# Patient Record
Sex: FEMALE | Race: BLACK OR AFRICAN AMERICAN | Employment: UNEMPLOYED | URBAN - METROPOLITAN AREA
[De-identification: names, ages, dates, MRNs, and addresses within clinical notes are randomized per-mention and may not be internally consistent; named-entity substitution may affect disease eponyms.]

---

## 2019-10-09 ENCOUNTER — HOSPITAL ENCOUNTER (INPATIENT)
Age: 34
LOS: 5 days | Discharge: HOME | DRG: 885 | End: 2019-10-14
Attending: EMERGENCY MEDICINE | Admitting: PSYCHIATRY & NEUROLOGY
Payer: COMMERCIAL

## 2019-10-09 DIAGNOSIS — Z00.8 MEDICAL CLEARANCE FOR PSYCHIATRIC ADMISSION: Primary | ICD-10-CM

## 2019-10-09 DIAGNOSIS — F30.9 MANIA (HCC): ICD-10-CM

## 2019-10-09 DIAGNOSIS — D64.9 CHRONIC ANEMIA: ICD-10-CM

## 2019-10-09 PROBLEM — F31.9 BIPOLAR 1 DISORDER (HCC): Status: ACTIVE | Noted: 2019-10-09

## 2019-10-09 LAB
ALBUMIN SERPL-MCNC: 4 G/DL (ref 3.5–5)
ALBUMIN/GLOB SERPL: 0.9 {RATIO} (ref 1.1–2.2)
ALP SERPL-CCNC: 65 U/L (ref 45–117)
ALT SERPL-CCNC: 26 U/L (ref 12–78)
AMPHET UR QL SCN: NEGATIVE
ANION GAP SERPL CALC-SCNC: 9 MMOL/L (ref 5–15)
APAP SERPL-MCNC: <2 UG/ML (ref 10–30)
APPEARANCE UR: CLEAR
AST SERPL-CCNC: 23 U/L (ref 15–37)
BACTERIA URNS QL MICRO: NEGATIVE /HPF
BARBITURATES UR QL SCN: NEGATIVE
BASOPHILS # BLD: 0.1 K/UL (ref 0–0.1)
BASOPHILS NFR BLD: 2 % (ref 0–1)
BENZODIAZ UR QL: NEGATIVE
BILIRUB SERPL-MCNC: 0.3 MG/DL (ref 0.2–1)
BILIRUB UR QL: NEGATIVE
BUN SERPL-MCNC: 11 MG/DL (ref 6–20)
BUN/CREAT SERPL: 10 (ref 12–20)
CALCIUM SERPL-MCNC: 9.4 MG/DL (ref 8.5–10.1)
CANNABINOIDS UR QL SCN: NEGATIVE
CHLORIDE SERPL-SCNC: 107 MMOL/L (ref 97–108)
CO2 SERPL-SCNC: 26 MMOL/L (ref 21–32)
COCAINE UR QL SCN: NEGATIVE
COLOR UR: NORMAL
CREAT SERPL-MCNC: 1.13 MG/DL (ref 0.55–1.02)
DIFFERENTIAL METHOD BLD: ABNORMAL
DRUG SCRN COMMENT,DRGCM: NORMAL
EOSINOPHIL # BLD: 0.2 K/UL (ref 0–0.4)
EOSINOPHIL NFR BLD: 4 % (ref 0–7)
EPITH CASTS URNS QL MICRO: NORMAL /LPF
ERYTHROCYTE [DISTWIDTH] IN BLOOD BY AUTOMATED COUNT: 17.6 % (ref 11.5–14.5)
ETHANOL SERPL-MCNC: <10 MG/DL
GLOBULIN SER CALC-MCNC: 4.4 G/DL (ref 2–4)
GLUCOSE SERPL-MCNC: 102 MG/DL (ref 65–100)
GLUCOSE UR STRIP.AUTO-MCNC: NEGATIVE MG/DL
HCG UR QL: NEGATIVE
HCT VFR BLD AUTO: 31.9 % (ref 35–47)
HGB BLD-MCNC: 9.4 G/DL (ref 11.5–16)
HGB UR QL STRIP: NEGATIVE
IMM GRANULOCYTES # BLD AUTO: 0 K/UL (ref 0–0.04)
IMM GRANULOCYTES NFR BLD AUTO: 0 % (ref 0–0.5)
KETONES UR QL STRIP.AUTO: NEGATIVE MG/DL
LEUKOCYTE ESTERASE UR QL STRIP.AUTO: NEGATIVE
LYMPHOCYTES # BLD: 1 K/UL (ref 0.8–3.5)
LYMPHOCYTES NFR BLD: 24 % (ref 12–49)
MCH RBC QN AUTO: 22.6 PG (ref 26–34)
MCHC RBC AUTO-ENTMCNC: 29.5 G/DL (ref 30–36.5)
MCV RBC AUTO: 76.7 FL (ref 80–99)
METHADONE UR QL: NEGATIVE
MONOCYTES # BLD: 0.5 K/UL (ref 0–1)
MONOCYTES NFR BLD: 13 % (ref 5–13)
NEUTS SEG # BLD: 2.3 K/UL (ref 1.8–8)
NEUTS SEG NFR BLD: 57 % (ref 32–75)
NITRITE UR QL STRIP.AUTO: NEGATIVE
NRBC # BLD: 0 K/UL (ref 0–0.01)
NRBC BLD-RTO: 0 PER 100 WBC
OPIATES UR QL: NEGATIVE
PCP UR QL: NEGATIVE
PH UR STRIP: 5 [PH] (ref 5–8)
PLATELET # BLD AUTO: 356 K/UL (ref 150–400)
PMV BLD AUTO: 10.3 FL (ref 8.9–12.9)
POTASSIUM SERPL-SCNC: 4.2 MMOL/L (ref 3.5–5.1)
PROT SERPL-MCNC: 8.4 G/DL (ref 6.4–8.2)
PROT UR STRIP-MCNC: NEGATIVE MG/DL
RBC # BLD AUTO: 4.16 M/UL (ref 3.8–5.2)
RBC #/AREA URNS HPF: NORMAL /HPF (ref 0–5)
SALICYLATES SERPL-MCNC: <1.7 MG/DL (ref 2.8–20)
SODIUM SERPL-SCNC: 142 MMOL/L (ref 136–145)
SP GR UR REFRACTOMETRY: 1.03 (ref 1–1.03)
T4 FREE SERPL-MCNC: 1.1 NG/DL (ref 0.8–1.5)
TSH SERPL DL<=0.05 MIU/L-ACNC: 0.89 UIU/ML (ref 0.36–3.74)
UA: UC IF INDICATED,UAUC: NORMAL
UROBILINOGEN UR QL STRIP.AUTO: 0.2 EU/DL (ref 0.2–1)
WBC # BLD AUTO: 4 K/UL (ref 3.6–11)
WBC URNS QL MICRO: NORMAL /HPF (ref 0–4)

## 2019-10-09 PROCEDURE — 84443 ASSAY THYROID STIM HORMONE: CPT

## 2019-10-09 PROCEDURE — 65220000003 HC RM SEMIPRIVATE PSYCH

## 2019-10-09 PROCEDURE — 84439 ASSAY OF FREE THYROXINE: CPT

## 2019-10-09 PROCEDURE — 74011250637 HC RX REV CODE- 250/637: Performed by: EMERGENCY MEDICINE

## 2019-10-09 PROCEDURE — 80053 COMPREHEN METABOLIC PANEL: CPT

## 2019-10-09 PROCEDURE — 36415 COLL VENOUS BLD VENIPUNCTURE: CPT

## 2019-10-09 PROCEDURE — 99285 EMERGENCY DEPT VISIT HI MDM: CPT

## 2019-10-09 PROCEDURE — 85025 COMPLETE CBC W/AUTO DIFF WBC: CPT

## 2019-10-09 PROCEDURE — 80307 DRUG TEST PRSMV CHEM ANLYZR: CPT

## 2019-10-09 PROCEDURE — 74011250637 HC RX REV CODE- 250/637: Performed by: PSYCHIATRY & NEUROLOGY

## 2019-10-09 PROCEDURE — 81025 URINE PREGNANCY TEST: CPT

## 2019-10-09 PROCEDURE — 81001 URINALYSIS AUTO W/SCOPE: CPT

## 2019-10-09 PROCEDURE — 74011250637 HC RX REV CODE- 250/637: Performed by: NURSE PRACTITIONER

## 2019-10-09 RX ORDER — LORAZEPAM 1 MG/1
1 TABLET ORAL
Status: COMPLETED | OUTPATIENT
Start: 2019-10-09 | End: 2019-10-09

## 2019-10-09 RX ORDER — LORAZEPAM 2 MG/ML
1 INJECTION INTRAMUSCULAR
Status: DISCONTINUED | OUTPATIENT
Start: 2019-10-09 | End: 2019-10-14 | Stop reason: HOSPADM

## 2019-10-09 RX ORDER — ZIPRASIDONE HYDROCHLORIDE 40 MG/1
80 CAPSULE ORAL
Status: DISCONTINUED | OUTPATIENT
Start: 2019-10-09 | End: 2019-10-10

## 2019-10-09 RX ORDER — BENZTROPINE MESYLATE 1 MG/1
1 TABLET ORAL
Status: DISCONTINUED | OUTPATIENT
Start: 2019-10-09 | End: 2019-10-14 | Stop reason: HOSPADM

## 2019-10-09 RX ORDER — ZIPRASIDONE HYDROCHLORIDE 80 MG/1
80 CAPSULE ORAL
COMMUNITY
End: 2019-10-14

## 2019-10-09 RX ORDER — ACETAMINOPHEN 325 MG/1
650 TABLET ORAL
Status: COMPLETED | OUTPATIENT
Start: 2019-10-09 | End: 2019-10-09

## 2019-10-09 RX ORDER — LANOLIN ALCOHOL/MO/W.PET/CERES
1 CREAM (GRAM) TOPICAL
Status: DISCONTINUED | OUTPATIENT
Start: 2019-10-10 | End: 2019-10-14 | Stop reason: HOSPADM

## 2019-10-09 RX ORDER — TRAZODONE HYDROCHLORIDE 50 MG/1
50 TABLET ORAL
Status: DISCONTINUED | OUTPATIENT
Start: 2019-10-09 | End: 2019-10-10

## 2019-10-09 RX ORDER — OXCARBAZEPINE 150 MG/1
600 TABLET, FILM COATED ORAL
Status: DISCONTINUED | OUTPATIENT
Start: 2019-10-09 | End: 2019-10-10

## 2019-10-09 RX ORDER — ACETAMINOPHEN 325 MG/1
650 TABLET ORAL
Status: DISCONTINUED | OUTPATIENT
Start: 2019-10-09 | End: 2019-10-14 | Stop reason: HOSPADM

## 2019-10-09 RX ORDER — OXCARBAZEPINE 600 MG/1
600 TABLET, FILM COATED ORAL
COMMUNITY
Start: 2019-10-07 | End: 2019-10-14

## 2019-10-09 RX ORDER — HYDROXYZINE 25 MG/1
50 TABLET, FILM COATED ORAL
Status: DISCONTINUED | OUTPATIENT
Start: 2019-10-09 | End: 2019-10-14 | Stop reason: HOSPADM

## 2019-10-09 RX ORDER — LORAZEPAM 1 MG/1
1 TABLET ORAL
Status: DISPENSED | OUTPATIENT
Start: 2019-10-09 | End: 2019-10-10

## 2019-10-09 RX ORDER — ADHESIVE BANDAGE
30 BANDAGE TOPICAL DAILY PRN
Status: DISCONTINUED | OUTPATIENT
Start: 2019-10-09 | End: 2019-10-14 | Stop reason: HOSPADM

## 2019-10-09 RX ORDER — DIPHENHYDRAMINE HYDROCHLORIDE 50 MG/ML
50 INJECTION, SOLUTION INTRAMUSCULAR; INTRAVENOUS
Status: DISCONTINUED | OUTPATIENT
Start: 2019-10-09 | End: 2019-10-14 | Stop reason: HOSPADM

## 2019-10-09 RX ORDER — HALOPERIDOL 5 MG/ML
5 INJECTION INTRAMUSCULAR
Status: DISCONTINUED | OUTPATIENT
Start: 2019-10-09 | End: 2019-10-14 | Stop reason: HOSPADM

## 2019-10-09 RX ORDER — OLANZAPINE 5 MG/1
5 TABLET ORAL
Status: DISCONTINUED | OUTPATIENT
Start: 2019-10-09 | End: 2019-10-14 | Stop reason: HOSPADM

## 2019-10-09 RX ADMIN — HYDROXYZINE HYDROCHLORIDE 50 MG: 25 TABLET, FILM COATED ORAL at 23:17

## 2019-10-09 RX ADMIN — ZIPRASIDONE HYDROCHLORIDE 80 MG: 40 CAPSULE ORAL at 23:15

## 2019-10-09 RX ADMIN — LORAZEPAM 1 MG: 1 TABLET ORAL at 10:17

## 2019-10-09 RX ADMIN — OXCARBAZEPINE 600 MG: 150 TABLET, FILM COATED ORAL at 23:15

## 2019-10-09 RX ADMIN — ACETAMINOPHEN 650 MG: 325 TABLET ORAL at 12:11

## 2019-10-09 NOTE — ED PROVIDER NOTES
EMERGENCY DEPARTMENT HISTORY AND PHYSICAL EXAM      Date: 10/9/2019  Patient Name: Hailey Andino    History of Presenting Illness     Chief Complaint   Patient presents with    Mental Health Problem     History Provided By: Patient and RBHA    HPI: Hailey Andino, 27 y.o. female with past medical history significant for anemia, asthma, uterine fibroids, hypothyroidism, PTSD, and darshan who presents in police custody under an ECO to the ED with cc of abnormal behavior. Per [facility], patient and her boyfriend got into an argument yesterday and he told her she need to go for a drive. She drove all the way from [place] to [place] where he put her up in a hotel room overnight. This morning when she got up, she walked out into traffic and sat/laid on the morse of a car. She states she did this to get warm. When she got off of the morse of the car, she got into a stranger's car. Please had to extricate her from the car and noticed that she was tangential, had rapid pressured speech, and it was not making any sense so they brought her to the emergency department for evaluation. Patient denies any acute symptoms. Patient does report a history of a psychiatric diagnosis, but states she has not been on medications for the past 7 years. PMHx: Anemia, asthma, uterine fibroids, PTSD, hyperthyroidism, darshan  Social Hx: Denies alcohol, tobacco, or illegal drug use    PCP: None    There are no other complaints, changes, or physical findings at this time. No current facility-administered medications on file prior to encounter. Current Outpatient Medications on File Prior to Encounter   Medication Sig Dispense Refill    OXcarbazepine (TRILEPTAL) 600 mg tablet Take 600 mg by mouth nightly. Indications: bipolar disorder      ziprasidone (GEODON) 80 mg capsule Take 80 mg by mouth nightly. Indications: bipolar disorder      [DISCONTINUED] OXCARBAZEPINE (TRILEPTAL PO) Take  by mouth.       [DISCONTINUED] ARIPIPRAZOLE (ABILIFY PO) Take  by mouth.  [DISCONTINUED] traZODone (DESYREL) 100 mg tablet Take 1 Tab by mouth nightly. 7 Tab 0     Past History     Past Medical History:  Past Medical History:   Diagnosis Date    Anemia     Anxiety     Asthma     Manic depression ([address]     Passive suicidal ideations      Past Surgical History:  Past Surgical History:   Procedure Laterality Date    HX OTHER SURGICAL      cyst removal     Family History:  No family history on file. Social History:  Social History     Tobacco Use    Smoking status: Never Smoker   Substance Use Topics    Alcohol use: Not on file    Drug use: Not on file     Allergies:  No Known Allergies  Review of Systems   Review of Systems   Unable to perform ROS: Psychiatric disorder     Physical Exam   Physical Exam   Constitutional: She is oriented to person, place, and time. She appears well-developed and well-nourished. HENT:   Head: Normocephalic and atraumatic. Mouth/Throat: Oropharynx is clear and moist.   Eyes: Conjunctivae and EOM are normal.   Neck: Normal range of motion and full passive range of motion without pain. Neck supple. Thyromegaly present. Cardiovascular: Normal rate, regular rhythm, S1 normal, S2 normal, normal heart sounds, intact distal pulses and normal pulses. No murmur heard. Pulmonary/Chest: Effort normal and breath sounds normal. No respiratory distress. She has no wheezes. Abdominal: Soft. Normal appearance and bowel sounds are normal. She exhibits no distension. There is no tenderness. There is no rebound. Musculoskeletal: Normal range of motion. Neurological: She is alert and oriented to person, place, and time. She has normal strength. Skin: Skin is warm, dry and intact. No rash noted. Psychiatric: She has a normal mood and affect. Judgment normal. Her speech is rapid and/or pressured and tangential. She is hyperactive. Nursing note and vitals reviewed.     Diagnostic Study Results   Labs - Recent Results (from the past 12 hour(s))   URINALYSIS W/ REFLEX CULTURE    Collection Time: 10/09/19 10:04 AM   Result Value Ref Range    Color YELLOW/STRAW      Appearance CLEAR CLEAR      Specific gravity 1.030 1.003 - 1.030      pH (UA) 5.0 5.0 - 8.0      Protein NEGATIVE  NEG mg/dL    Glucose NEGATIVE  NEG mg/dL    Ketone NEGATIVE  NEG mg/dL    Bilirubin NEGATIVE  NEG      Blood NEGATIVE  NEG      Urobilinogen 0.2 0.2 - 1.0 EU/dL    Nitrites NEGATIVE  NEG      Leukocyte Esterase NEGATIVE  NEG      WBC 0-4 0 - 4 /hpf    RBC 0-5 0 - 5 /hpf    Epithelial cells FEW FEW /lpf    Bacteria NEGATIVE  NEG /hpf    UA:UC IF INDICATED CULTURE NOT INDICATED BY UA RESULT CNI     METABOLIC PANEL, COMPREHENSIVE    Collection Time: 10/09/19 10:04 AM   Result Value Ref Range    Sodium 142 136 - 145 mmol/L    Potassium 4.2 3.5 - 5.1 mmol/L    Chloride 107 97 - 108 mmol/L    CO2 26 21 - 32 mmol/L    Anion gap 9 5 - 15 mmol/L    Glucose 102 (H) 65 - 100 mg/dL    BUN 11 6 - 20 MG/DL    Creatinine 1.13 (H) 0.55 - 1.02 MG/DL    BUN/Creatinine ratio 10 (L) 12 - 20      GFR est AA >60 >60 ml/min/1.73m2    GFR est non-AA 55 (L) >60 ml/min/1.73m2    Calcium 9.4 8.5 - 10.1 MG/DL    Bilirubin, total 0.3 0.2 - 1.0 MG/DL    ALT (SGPT) 26 12 - 78 U/L    AST (SGOT) 23 15 - 37 U/L    Alk.  phosphatase 65 45 - 117 U/L    Protein, total 8.4 (H) 6.4 - 8.2 g/dL    Albumin 4.0 3.5 - 5.0 g/dL    Globulin 4.4 (H) 2.0 - 4.0 g/dL    A-G Ratio 0.9 (L) 1.1 - 2.2     CBC WITH AUTOMATED DIFF    Collection Time: 10/09/19 10:04 AM   Result Value Ref Range    WBC 4.0 3.6 - 11.0 K/uL    RBC 4.16 3.80 - 5.20 M/uL    HGB 9.4 (L) 11.5 - 16.0 g/dL    HCT 31.9 (L) 35.0 - 47.0 %    MCV 76.7 (L) 80.0 - 99.0 FL    MCH 22.6 (L) 26.0 - 34.0 PG    MCHC 29.5 (L) 30.0 - 36.5 g/dL    RDW 17.6 (H) 11.5 - 14.5 %    PLATELET 270 401 - 235 K/uL    MPV 10.3 8.9 - 12.9 FL    NRBC 0.0 0  WBC    ABSOLUTE NRBC 0.00 0.00 - 0.01 K/uL    NEUTROPHILS 57 32 - 75 %    LYMPHOCYTES 24 12 - 49 %    MONOCYTES 13 5 - 13 %    EOSINOPHILS 4 0 - 7 %    BASOPHILS 2 (H) 0 - 1 %    IMMATURE GRANULOCYTES 0 0.0 - 0.5 %    ABS. NEUTROPHILS 2.3 1.8 - 8.0 K/UL    ABS. LYMPHOCYTES 1.0 0.8 - 3.5 K/UL    ABS. MONOCYTES 0.5 0.0 - 1.0 K/UL    ABS. EOSINOPHILS 0.2 0.0 - 0.4 K/UL    ABS. BASOPHILS 0.1 0.0 - 0.1 K/UL    ABS. IMM. GRANS. 0.0 0.00 - 0.04 K/UL    DF AUTOMATED     ETHYL ALCOHOL    Collection Time: 10/09/19 10:04 AM   Result Value Ref Range    ALCOHOL(ETHYL),SERUM <10 <10 MG/DL   DRUG SCREEN, URINE    Collection Time: 10/09/19 10:04 AM   Result Value Ref Range    AMPHETAMINES NEGATIVE  NEG      BARBITURATES NEGATIVE  NEG      BENZODIAZEPINES NEGATIVE  NEG      COCAINE NEGATIVE  NEG      METHADONE NEGATIVE  NEG      OPIATES NEGATIVE  NEG      PCP(PHENCYCLIDINE) NEGATIVE  NEG      THC (TH-CANNABINOL) NEGATIVE  NEG      Drug screen comment (NOTE)    SALICYLATE    Collection Time: 10/09/19 10:04 AM   Result Value Ref Range    Salicylate level <9.1 (L) 2.8 - 20.0 MG/DL   ACETAMINOPHEN    Collection Time: 10/09/19 10:04 AM   Result Value Ref Range    Acetaminophen level <2 (L) 10 - 30 ug/mL   T4, FREE    Collection Time: 10/09/19 10:04 AM   Result Value Ref Range    T4, Free 1.1 0.8 - 1.5 NG/DL   TSH 3RD GENERATION    Collection Time: 10/09/19 10:04 AM   Result Value Ref Range    TSH 0.89 0.36 - 3.74 uIU/mL   HCG URINE, QL. - POC    Collection Time: 10/09/19 11:01 AM   Result Value Ref Range    Pregnancy test,urine (POC) NEGATIVE  NEG         Radiologic Studies -   No orders to display     No results found. Medical Decision Making   I am the first provider for this patient. I reviewed the vital signs, available nursing notes, past medical history, past surgical history, family history and social history. Vital Signs-Reviewed the patient's vital signs.   Patient Vitals for the past 12 hrs:   Temp Pulse Resp BP SpO2   10/09/19 1423 98.1 °F (36.7 °C) 88 20 122/76 100 %   10/09/19 1000 98.4 °F (36.9 °C) 90 18 118/64 100 %     Pulse Oximetry Analysis - 100% on RA    Records Reviewed: Nursing Notes    Provider Notes (Medical Decision Making):   76-year-old female presents in police custody under an ECO for abnormal behavior and tangential thoughts. She does have a history of hyperthyroidism, but she is not tachycardic. Differential includes acute trupti, substance abuse, lecture light abnormality, thyroid storm. Highly suspect that this is psychiatric in nature, but will rule out metabolic causes and defer psychiatric disposition to crisis. Suspect patient will medically clear and will need psychiatric admission under a TDO. ED Course:   Initial assessment performed. The patients presenting problems have been discussed, and they are in agreement with the care plan formulated and outlined with them. I have encouraged them to ask questions as they arise throughout their visit. Progress Note  11:43 AM  I have re-evaluated pt and she is medically cleared at this time. Labs are unremarkable for any acute metabolic abnormalities. Progress Note:   Updated pt on all returned results and findings. Discussed the importance of proper follow up as referred below along with return precautions. Pt in agreement with the care plan and expresses agreement with and understanding of all items discussed. Disposition:  Admit to inpatient psychiatry    PLAN:  1. Admit under psychiatric TDO    Diagnosis     Clinical Impression:   1. Medical clearance for psychiatric admission    2. Chronic anemia    3. Trupti [facility])            Please note that this dictation was completed with Dragon, computer voice recognition software. Quite often unanticipated grammatical, syntax, homophones, and other interpretive errors are inadvertently transcribed by the computer software. Please disregard these errors. Additionally, please excuse any errors that have escaped final proofreading.

## 2019-10-09 NOTE — ED TRIAGE NOTES
Patient here with ECO per [facility] police for stopping traffic, climbing on top of the cars at that time. Is bipolar and not taking medication.

## 2019-10-09 NOTE — H&P
Hospitalist Admission Note    NAME: Shaquille Post   :  1985   MRN:  [id number]   Room Number: 201/39  @ [facility]     Date/Time:  10/9/2019 4:20 PM    Patient PCP: None  ______________________________________________________________________  Given the patient's current clinical presentation, I have a high level of concern for decompensation if discharged from the emergency department. Complex decision making was performed, which includes reviewing the patient's available past medical records, laboratory results, and x-ray films. My assessment of this patient's clinical condition and my plan of care is as follows. Assessment / Plan: Active Problems:    Bipolar 1 disorder ([address] (10/9/2019)      PLAN:  1. Psychiatric treatment and management of health issues,Defer to psychiatrist for further management. 2.  Continue home meds. Restart iron tabs. Outpatient follow up with PCP for iron deficiency anemia, goiter and menorrhagia  3. Medically stable at this time. We will follow up on a p.r.n. basis. 4.  No VTE prophylaxis indicated or warranted at this time. Body mass index is 31.82 kg/m². Subjective:   CHIEF COMPLAINT:  Erratic behavior    HISTORY OF PRESENT ILLNESS:     Kaitlin Flannery is a 27 y.o.  female with PMH of bipolar type 1, PTSD, uterine fibroid, iron deficiency anemia, goiter who presents to ED as an ECO with erratic behavioral. She reports not eating or sleeping for the last 3 days, endorses anxiety and OCD type behavior 'I have never been diagnosed with it but I think I have OCD. I have to wipe the phone 3 times before I use it. Now that I left my sandwich in the room I dont want to eat it'. She was apparently noted to get into a stranger's car and had to be extricated by the police from the car. She has flight of ideas and rapid pressured speech.  States 'I have bipolar 1 but that is not my primary diagnosis, my primary diagnosis is PTSD.' She denies taking medications at home except iron pills. She has hx of uterine fibroids, had heavy menstrual periods for 7-10 days a month at 30 day intervals. She has been told she is 'borderline anemic'. She has not taken iron supplements for the last 3 days and is worried that will affect her iron. Patient denies any past medical history except as listed above. Denies fever,chills,chest pain, sob,abd pan,diarrhea,constipation,lighhadedness. No Hx DM,HTN. No current medical concerns at this time. Past Medical History:   Diagnosis Date    Anemia     Anxiety     Asthma     Manic depression ([facility] [address]     Passive suicidal ideations         Past Surgical History:   Procedure Laterality Date    HX OTHER SURGICAL      cyst removal       Social History     Tobacco Use    Smoking status: Never Smoker    Smokeless tobacco: Never Used   Substance Use Topics    Alcohol use: Not Currently        History reviewed. No pertinent family history. No Known Allergies     Prior to Admission medications    Medication Sig Start Date End Date Taking? Authorizing Provider   OXcarbazepine (TRILEPTAL) 600 mg tablet Take 600 mg by mouth nightly. Indications: bipolar disorder 10/7/19 10/6/20 Yes Other, MD Shandra   ziprasidone (GEODON) 80 mg capsule Take 80 mg by mouth nightly.  Indications: bipolar disorder   Yes Provider, Historical       REVIEW OF SYSTEMS:     Total of 12 systems reviewed as follows:         General:  Negative for fever, chills, sweats, generalized weakness, weight loss/gain,      loss of appetite   Eyes:    Negative forblurred vision, eye pain, loss of vision, double vision  ENT:    rhinorrhea, pharyngitis   Respiratory:   Negative for cough, sputum production, SOB, ANDERSON, wheezing, pleuritic pain   Cardiology:   Negative for chest pain, palpitations, orthopnea, PND, edema, syncope   Gastrointestinal:  Negative for abdominal pain , N/V, diarrhea, dysphagia, constipation, bleeding Genitourinary:  Negative for frequency, urgency, dysuria, hematuria, incontinence   Muskuloskeletal :  Negative for arthralgia, myalgia, back pain  Hematology:  Negative for easy bruising, nose or gum bleeding, lymphadenopathy   Dermatological: Negative for rash, ulceration, pruritis, color change / jaundice  Endocrine:   Negative for hot flashes or polydipsia   Neurological:  Negative for headache, dizziness, confusion, focal weakness, paresthesia,     Speech difficulties, memory loss, gait difficulty  Psychological: Negative for Feelings of agitation/SI/HI/AH/VH    Objective:   VITALS:    Visit Vitals  /76   Pulse 88   Temp 98.1 °F (36.7 °C)   Resp 20   Ht 5' 2.99\" (1.6 m)   Wt 81.5 kg (179 lb 9.6 oz)   SpO2 100%   BMI 31.82 kg/m²       PHYSICAL EXAM:    General:    Alert, cooperative, no distress, appears stated age. HEENT: Atraumatic, anicteric sclerae, pale conjunctivae     No oral ulcers, mucosa moist, throat clear, dentition fair  Neck:  Supple, symmetrical,  no JVD, thyroid: non tender, enlarged  Lungs:   Clear to auscultation bilaterally. No Wheezing or Rhonchi. No rales. Chest wall:  No tenderness  No Accessory muscle use. Heart:   Regular  rhythm,  No  murmur   No edema  Abdomen:   Soft, non-tender. Not distended. Bowel sounds normal  Extremities: No cyanosis. No clubbing,      Skin turgor normal, Capillary refill normal, Radial dial pulse 2+  Skin:       Not Jaundiced  No rashes   Psych:  Psychiatric:   Mood: anxious, depressed, irritable and labile  Affect: variable  Thoughts: flight of ideas  Speech: pressured  Sensorium: No A/V hallucinations  Safety: no SI/HI        Neurologic: EOMs intact. No facial asymmetry. No aphasia or slurred speech. Symmetrical strength, Alert and oriented X 4.     ______________________________________________________________________    Care Plan discussed with:  Patient/Family and Nurse    Expected  Disposition:  per primary attending. ________________________________________________________________________  TOTAL TIME:  20 Minutes    Critical Care Provided     Minutes non procedure based      Comments     Reviewed previous records   >50% of visit spent in counseling and coordination of care  Discussion with patient and/or family and questions answered       ________________________________________________________________________  Signed: Patricia Brown MD    Procedures: see electronic medical records for all procedures/Xrays and details which were not copied into this note but were reviewed prior to creation of Plan. LAB DATA REVIEWED:    Recent Results (from the past 24 hour(s))   URINALYSIS W/ REFLEX CULTURE    Collection Time: 10/09/19 10:04 AM   Result Value Ref Range    Color YELLOW/STRAW      Appearance CLEAR CLEAR      Specific gravity 1.030 1.003 - 1.030      pH (UA) 5.0 5.0 - 8.0      Protein NEGATIVE  NEG mg/dL    Glucose NEGATIVE  NEG mg/dL    Ketone NEGATIVE  NEG mg/dL    Bilirubin NEGATIVE  NEG      Blood NEGATIVE  NEG      Urobilinogen 0.2 0.2 - 1.0 EU/dL    Nitrites NEGATIVE  NEG      Leukocyte Esterase NEGATIVE  NEG      WBC 0-4 0 - 4 /hpf    RBC 0-5 0 - 5 /hpf    Epithelial cells FEW FEW /lpf    Bacteria NEGATIVE  NEG /hpf    UA:UC IF INDICATED CULTURE NOT INDICATED BY UA RESULT CNI     METABOLIC PANEL, COMPREHENSIVE    Collection Time: 10/09/19 10:04 AM   Result Value Ref Range    Sodium 142 136 - 145 mmol/L    Potassium 4.2 3.5 - 5.1 mmol/L    Chloride 107 97 - 108 mmol/L    CO2 26 21 - 32 mmol/L    Anion gap 9 5 - 15 mmol/L    Glucose 102 (H) 65 - 100 mg/dL    BUN 11 6 - 20 MG/DL    Creatinine 1.13 (H) 0.55 - 1.02 MG/DL    BUN/Creatinine ratio 10 (L) 12 - 20      GFR est AA >60 >60 ml/min/1.73m2    GFR est non-AA 55 (L) >60 ml/min/1.73m2    Calcium 9.4 8.5 - 10.1 MG/DL    Bilirubin, total 0.3 0.2 - 1.0 MG/DL    ALT (SGPT) 26 12 - 78 U/L    AST (SGOT) 23 15 - 37 U/L    Alk.  phosphatase 65 45 - 117 U/L    Protein, total 8.4 (H) 6.4 - 8.2 g/dL    Albumin 4.0 3.5 - 5.0 g/dL    Globulin 4.4 (H) 2.0 - 4.0 g/dL    A-G Ratio 0.9 (L) 1.1 - 2.2     CBC WITH AUTOMATED DIFF    Collection Time: 10/09/19 10:04 AM   Result Value Ref Range    WBC 4.0 3.6 - 11.0 K/uL    RBC 4.16 3.80 - 5.20 M/uL    HGB 9.4 (L) 11.5 - 16.0 g/dL    HCT 31.9 (L) 35.0 - 47.0 %    MCV 76.7 (L) 80.0 - 99.0 FL    MCH 22.6 (L) 26.0 - 34.0 PG    MCHC 29.5 (L) 30.0 - 36.5 g/dL    RDW 17.6 (H) 11.5 - 14.5 %    PLATELET 669 096 - 194 K/uL    MPV 10.3 8.9 - 12.9 FL    NRBC 0.0 0  WBC    ABSOLUTE NRBC 0.00 0.00 - 0.01 K/uL    NEUTROPHILS 57 32 - 75 %    LYMPHOCYTES 24 12 - 49 %    MONOCYTES 13 5 - 13 %    EOSINOPHILS 4 0 - 7 %    BASOPHILS 2 (H) 0 - 1 %    IMMATURE GRANULOCYTES 0 0.0 - 0.5 %    ABS. NEUTROPHILS 2.3 1.8 - 8.0 K/UL    ABS. LYMPHOCYTES 1.0 0.8 - 3.5 K/UL    ABS. MONOCYTES 0.5 0.0 - 1.0 K/UL    ABS. EOSINOPHILS 0.2 0.0 - 0.4 K/UL    ABS. BASOPHILS 0.1 0.0 - 0.1 K/UL    ABS. IMM.  GRANS. 0.0 0.00 - 0.04 K/UL    DF AUTOMATED     ETHYL ALCOHOL    Collection Time: 10/09/19 10:04 AM   Result Value Ref Range    ALCOHOL(ETHYL),SERUM <10 <10 MG/DL   DRUG SCREEN, URINE    Collection Time: 10/09/19 10:04 AM   Result Value Ref Range    AMPHETAMINES NEGATIVE  NEG      BARBITURATES NEGATIVE  NEG      BENZODIAZEPINES NEGATIVE  NEG      COCAINE NEGATIVE  NEG      METHADONE NEGATIVE  NEG      OPIATES NEGATIVE  NEG      PCP(PHENCYCLIDINE) NEGATIVE  NEG      THC (TH-CANNABINOL) NEGATIVE  NEG      Drug screen comment (NOTE)    SALICYLATE    Collection Time: 10/09/19 10:04 AM   Result Value Ref Range    Salicylate level <2.4 (L) 2.8 - 20.0 MG/DL   ACETAMINOPHEN    Collection Time: 10/09/19 10:04 AM   Result Value Ref Range    Acetaminophen level <2 (L) 10 - 30 ug/mL   T4, FREE    Collection Time: 10/09/19 10:04 AM   Result Value Ref Range    T4, Free 1.1 0.8 - 1.5 NG/DL   TSH 3RD GENERATION    Collection Time: 10/09/19 10:04 AM   Result Value Ref Range    TSH 0.89 0.36 - 3.74 uIU/mL   HCG URINE, QL. - POC    Collection Time: 10/09/19 11:01 AM   Result Value Ref Range    Pregnancy test,urine (POC) NEGATIVE  NEG

## 2019-10-09 NOTE — ED NOTES
Patient lying on couch in room at this time. Will not speak with nurse when offering her Ativan at this time. Charted that medication was refused.

## 2019-10-09 NOTE — ED NOTES
Patient upset at this time due to being unable to make phone call and yelling at [facility] crisis counselor at this time when she relayed that patient was admitted but awaiting room assignment at this time. Patient yelling \" you lied to me, you lied to me. Why did you tell me that I was being admitted but there is not a bed ready for me, you lied. I also still want to make phone call to my boyfriend to tell him I'm ok. If I can't make a phone call here the  can call him for me\". Relayed to patient it was not ok to yell at staff, the  will not make phone call for you but you will be able to use phone when she gets upstairs\". Patient continues yelling same expression over and over. Relayed to MD patient continues with aggressive behavior at this time.

## 2019-10-09 NOTE — ED NOTES
Patient standing at doorway at this time repeatedly asking to have counselors door opened at this time, patient repeating over and over again. Relayed to patient to please sit on couch, patient not comprehending that door would not be opened. Patient refusing to have \"the door closed\". [employer] officer at this time attempting to close door, patient standing in doorway \" the  said that I could  the doorway with the door open. Instructed patient if she does not stop repeating her request to have door open we would close the door. Patient stopped at this time.

## 2019-10-09 NOTE — GROUP NOTE
DONNA  GROUP DOCUMENTATION INDIVIDUAL Group Therapy Note Date: October 9 Group Start Time: 9880 Group End Time: 0300 Group Topic: Recreational/Music Therapy [facility] 3 ACUTE BEHAV [facility], [address] GROUP DOCUMENTATION GROUP Group Therapy Note Attendees: 5 Attendance: Attended Patient's Goal:  To concentrate on selected task Interventions/techniques: Supported-crafts,games,music Follows Directions: Followed directions Interactions: Interacted appropriately Mental Status: Flat Behavior/appearance: Cooperative Goals Achieved: Able to receive feedback Additional Notes:   
 
Theoplis Roots

## 2019-10-09 NOTE — ED NOTES
Pt requested tylenol for HA, tylenol given upon request. Pt also requested ice, OJ and blanket. All given as requested. Pt currently eating lunch tray.

## 2019-10-09 NOTE — ROUTINE PROCESS
TRANSFER - OUT REPORT: 
 
Verbal report given to Jessika Payton RN(name) on Faisal Boy  being transferred to [facility](unit) for routine progression of care Report consisted of patients Situation, Background, Assessment and  
Recommendations(SBAR). Information from the following report(s) SBAR, ED Summary and MAR was reviewed with the receiving nurse. Lines:    
 
Opportunity for questions and clarification was provided. Patient transported with: 
 Cindy Palma

## 2019-10-09 NOTE — PROGRESS NOTES
Problem: Altered Thought Process (Adult/Pediatric)  Goal: *STG: Participates in treatment plan  Outcome: Progressing Towards Goal     Problem: Altered Thought Process (Adult/Pediatric)  Goal: *STG: Participates in treatment plan  Outcome: Progressing Towards Goal  Goal: *STG: Remains safe in hospital  Outcome: Progressing Towards Goal  Goal: *STG: Seeks staff when feelings of anxiety and fear arise  Outcome: Progressing Towards Goal  Goal: *STG: Complies with medication therapy  Outcome: Progressing Towards Goal  Goal: *STG: Attends activities and groups  Outcome: Progressing Towards Goal  Goal: *STG: Decreased delusional thinking  Outcome: Progressing Towards Goal     Problem: Altered Thought Process (Adult/Pediatric)  Goal: *STG: Seeks staff when feelings of anxiety and fear arise  Outcome: Progressing Towards Goal
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
## 2019-10-09 NOTE — PROGRESS NOTES
[facility] Admission Pharmacy Medication Reconciliation     Recommendations/Findings:   1) From review of \"Care Everywhere\" records, patient was just admitted at [facility] 10/4-10/7/19. PTA medication list below updated based on discharge medication list.  Unknown if patient filled prescriptions at discharge. Suspect non-compliance with medications. Total Time Spent: 10 minutes    Information obtained from: Chart review    Patient allergies: Allergies as of 10/09/2019    (No Known Allergies)       Prior to Admission Medications   Prescriptions Last Dose Informant Patient Reported? Taking? OXcarbazepine (TRILEPTAL) 600 mg tablet 10/6/2019 Transfer Papers Yes Yes   Sig: Take 600 mg by mouth nightly. Indications: bipolar disorder   ziprasidone (GEODON) 80 mg capsule 10/6/2019 Transfer Papers Yes Yes   Sig: Take 80 mg by mouth nightly.  Indications: bipolar disorder      Facility-Administered Medications: None        Thank you,  Shona Thakur, [address] [facility]  Desk: [phone]  Pharmacy: [phone]

## 2019-10-10 PROBLEM — F31.62 BIPOLAR 1 DISORDER, MIXED, MODERATE (HCC): Status: ACTIVE | Noted: 2019-10-10

## 2019-10-10 PROBLEM — F31.9 BIPOLAR 1 DISORDER (HCC): Status: RESOLVED | Noted: 2019-10-09 | Resolved: 2019-10-10

## 2019-10-10 PROCEDURE — 74011250637 HC RX REV CODE- 250/637: Performed by: STUDENT IN AN ORGANIZED HEALTH CARE EDUCATION/TRAINING PROGRAM

## 2019-10-10 PROCEDURE — 74011250637 HC RX REV CODE- 250/637: Performed by: PSYCHIATRY & NEUROLOGY

## 2019-10-10 PROCEDURE — 65220000003 HC RM SEMIPRIVATE PSYCH

## 2019-10-10 RX ORDER — DOXEPIN HYDROCHLORIDE 25 MG/1
25 CAPSULE ORAL
Status: DISCONTINUED | OUTPATIENT
Start: 2019-10-10 | End: 2019-10-14 | Stop reason: HOSPADM

## 2019-10-10 RX ORDER — ZIPRASIDONE HYDROCHLORIDE 40 MG/1
80 CAPSULE ORAL
Status: DISCONTINUED | OUTPATIENT
Start: 2019-10-10 | End: 2019-10-11

## 2019-10-10 RX ORDER — DOXEPIN HYDROCHLORIDE 25 MG/1
25 CAPSULE ORAL EVERY EVENING
Status: DISCONTINUED | OUTPATIENT
Start: 2019-10-10 | End: 2019-10-10

## 2019-10-10 RX ORDER — OXCARBAZEPINE 150 MG/1
300 TABLET, FILM COATED ORAL 2 TIMES DAILY
Status: DISCONTINUED | OUTPATIENT
Start: 2019-10-10 | End: 2019-10-14 | Stop reason: HOSPADM

## 2019-10-10 RX ADMIN — TRAZODONE HYDROCHLORIDE 50 MG: 50 TABLET ORAL at 02:16

## 2019-10-10 RX ADMIN — FERROUS SULFATE TAB 325 MG (65 MG ELEMENTAL FE) 325 MG: 325 (65 FE) TAB at 08:44

## 2019-10-10 RX ADMIN — OXCARBAZEPINE 300 MG: 150 TABLET, FILM COATED ORAL at 17:34

## 2019-10-10 RX ADMIN — ZIPRASIDONE HYDROCHLORIDE 80 MG: 40 CAPSULE ORAL at 17:34

## 2019-10-10 NOTE — PROGRESS NOTES
Problem: Altered Thought Process (Adult/Pediatric)  Goal: *STG: Remains safe in hospital  Outcome: Progressing Towards Goal

## 2019-10-10 NOTE — BH NOTES
0730 Assumed care of pt.  0830 Pt. up to day room for breakfast  1030 Pt. ambulating in hallway. 1230 Pt. up for lunch in day room. 1430 Pt. resting in room. 1730 Pt. ambulating in hallway.

## 2019-10-10 NOTE — GROUP NOTE
[facility] GROUP DOCUMENTATION INDIVIDUAL Group Therapy Note Date: October 10 Group Start Time: 9783 Group End Time: 4442 Group Topic: Nursing 137 [facility] 3 ACUTE BEHAV HLTH Nelsy Lyle RN 
 
[facility] GROUP DOCUMENTATION GROUP Group Therapy Note Attendees: 6 Attendance: Attended Patient's Goal:  To attentively participate in group Interventions/techniques: Promoted peer support and Provide feedback Follows Directions: Followed directions Interactions: Disorganized interaction Mental Status: Calm Behavior/appearance: Cooperative Goals Achieved: Able to engage in interactions, Able to listen to others, Able to give feedback to another and Able to reflect/comment on own behavior Kary Harris RN
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
## 2019-10-10 NOTE — GROUP NOTE
[facility]  GROUP DOCUMENTATION INDIVIDUAL Group Therapy Note Date: October 10 Group Start Time: 1100 Group End Time: 1200 Group Topic: Topic Group 137 [facility] 3 ACUTE BEHAV [facility], [address] [facility] GROUP DOCUMENTATION GROUP Group Therapy Note Attendees: 5 Attendance: Did not attend Patient's Goal: Interventions/techniques: 
Boby Carpenter

## 2019-10-10 NOTE — PROGRESS NOTES
Problem: Altered Thought Process (Adult/Pediatric)  Goal: *STG: Participates in treatment plan  Outcome: Progressing Towards Goal  Goal: *STG: Remains safe in hospital  Outcome: Progressing Towards Goal  Goal: *STG: Seeks staff when feelings of anxiety and fear arise  Outcome: Progressing Towards Goal  Goal: *STG: Complies with medication therapy  Outcome: Progressing Towards Goal  Goal: *STG: Attends activities and groups  Outcome: Progressing Towards Goal  Goal: *STG: Decreased delusional thinking  Outcome: Progressing Towards Goal

## 2019-10-10 NOTE — BH NOTES
Patient requested to leave facility. Call placed to on call, Cici Belle NP. Per NP Jorje call placed to crisis. Spoke with \"Kit\" . Kit stated crisis staff had spoken with patient earlier in the day and therefore a face to face visit was not necessary. She requested to speak with patient on phone. Patient was allowed to speak with her. Per patient conclusion to phone call was she will be staying.

## 2019-10-10 NOTE — GROUP NOTE
[facility]  GROUP DOCUMENTATION INDIVIDUAL Group Therapy Note Date: October 10 Group Start Time: 5376 Group End Time: 0300 Group Topic: Recreational/Music Therapy [facility] 3 ACUTE BEHAV [facility], [address] GROUP DOCUMENTATION GROUP Group Therapy Note Attendees: 4 Attendance: Attended Patient's Goal:  To concentrate on selected task Interventions/techniques: Supported-crafts,games,music Follows Directions: Followed directions with help from staff Interactions: Disorganized interaction Mental Status: Anxious Behavior/appearance: Argumentative Goals Achieved: Able to self-disclose Additional Notes:  Pt was hyper verbal,anxious,opinionated-required redirection and encouragement Reggie Cheung

## 2019-10-10 NOTE — BH NOTES
Patient with pressured, rapid speech. Pacing in hallways. Verbally abusive to staff and peers. Intrusive. Difficult to redirect. Patient is medication compliant. Slept approx 4 hours.
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
## 2019-10-11 PROCEDURE — 74011250637 HC RX REV CODE- 250/637: Performed by: PSYCHIATRY & NEUROLOGY

## 2019-10-11 PROCEDURE — 65220000003 HC RM SEMIPRIVATE PSYCH

## 2019-10-11 PROCEDURE — 74011250637 HC RX REV CODE- 250/637: Performed by: STUDENT IN AN ORGANIZED HEALTH CARE EDUCATION/TRAINING PROGRAM

## 2019-10-11 RX ORDER — ZIPRASIDONE HYDROCHLORIDE 40 MG/1
80 CAPSULE ORAL 2 TIMES DAILY WITH MEALS
Status: DISCONTINUED | OUTPATIENT
Start: 2019-10-11 | End: 2019-10-12

## 2019-10-11 RX ADMIN — HYDROXYZINE HYDROCHLORIDE 50 MG: 25 TABLET, FILM COATED ORAL at 22:10

## 2019-10-11 RX ADMIN — OXCARBAZEPINE 300 MG: 150 TABLET, FILM COATED ORAL at 22:10

## 2019-10-11 RX ADMIN — FERROUS SULFATE TAB 325 MG (65 MG ELEMENTAL FE) 325 MG: 325 (65 FE) TAB at 08:18

## 2019-10-11 RX ADMIN — ZIPRASIDONE HYDROCHLORIDE 80 MG: 40 CAPSULE ORAL at 18:12

## 2019-10-11 RX ADMIN — DOXEPIN HYDROCHLORIDE 25 MG: 25 CAPSULE ORAL at 02:21

## 2019-10-11 RX ADMIN — OXCARBAZEPINE 300 MG: 150 TABLET, FILM COATED ORAL at 08:18

## 2019-10-11 NOTE — GROUP NOTE
[facility]  GROUP DOCUMENTATION INDIVIDUAL Group Therapy Note Date: October 11 Group Start Time: 0900 Group End Time: 0930 Group Topic: Recreational/Music Therapy [facility] [place] 3 ACUTE BEHAV HLTH Jean-Claude THOMPSON  GROUP DOCUMENTATION GROUP Group Therapy Note Attendees: 4 Attendance: Attended Patient's Goal:  Decrease anxiety Interventions/techniques: Challenged Follows Directions: Followed directions Interactions: Interacted appropriately Mental Status: Calm Behavior/appearance: Attentive Goals Achieved: Able to engage in interactions and Able to listen to others Additional Notes:   
 
Ching Grey

## 2019-10-11 NOTE — GROUP NOTE
DONNA  GROUP DOCUMENTATION INDIVIDUAL Group Therapy Note Date: October 11 Group Start Time: 6941 Group End Time: 0300 Group Topic: Recreational/Music Therapy [facility] [place] 3 ACUTE BEHAV [facility], [address] GROUP DOCUMENTATION GROUP Group Therapy Note Attendees: 4 Attendance: Attended Patient's Goal:  To concentrate on selected task Interventions/techniques: Supported-crafts,games,music Follows Directions: Followed directions Interactions: Did not interact appropriately Mental Status: Anxious Behavior/appearance: hyper verbal,intrusive Goals Achieved: Able to engage in interactions-eager to complete selected task Additional Notes:  Pt required redirection at times. Ernestine Arreola

## 2019-10-11 NOTE — PROGRESS NOTES
Laboratory monitoring for mood stabilizer and antipsychotics:    Recommended baseline monitoring has not been completed based on this patient's current medication regimen. The following labs have been ordered to complete baseline monitoring: lipid panel     The patient is currently taking the following medication(s):   Current Facility-Administered Medications   Medication Dose Route Frequency    ziprasidone (GEODON) capsule 80 mg  80 mg Oral DAILY WITH DINNER    OXcarbazepine (TRILEPTAL) tablet 300 mg  300 mg Oral BID    ferrous sulfate tablet 325 mg  1 Tab Oral DAILY WITH BREAKFAST       Height, Weight, BMI Estimation  Estimated body mass index is 31.82 kg/m² as calculated from the following:    Height as of this encounter: 160 cm (62.99\"). Weight as of this encounter: 81.5 kg (179 lb 9.6 oz). Renal Function, Hepatic Function and Chemistry  Estimated Creatinine Clearance: 70.2 mL/min (A) (by C-G formula based on SCr of 1.13 mg/dL (H)). Lab Results   Component Value Date/Time    Sodium 142 10/09/2019 10:04 AM    Potassium 4.2 10/09/2019 10:04 AM    Chloride 107 10/09/2019 10:04 AM    CO2 26 10/09/2019 10:04 AM    Anion gap 9 10/09/2019 10:04 AM    Glucose 102 (H) 10/09/2019 10:04 AM    BUN 11 10/09/2019 10:04 AM    Creatinine 1.13 (H) 10/09/2019 10:04 AM    BUN/Creatinine ratio 10 (L) 10/09/2019 10:04 AM    GFR est AA >60 10/09/2019 10:04 AM    GFR est non-AA 55 (L) 10/09/2019 10:04 AM    Calcium 9.4 10/09/2019 10:04 AM    ALT (SGPT) 26 10/09/2019 10:04 AM    AST (SGOT) 23 10/09/2019 10:04 AM    Alk.  phosphatase 65 10/09/2019 10:04 AM    Protein, total 8.4 (H) 10/09/2019 10:04 AM    Albumin 4.0 10/09/2019 10:04 AM    Globulin 4.4 (H) 10/09/2019 10:04 AM    A-G Ratio 0.9 (L) 10/09/2019 10:04 AM    Bilirubin, total 0.3 10/09/2019 10:04 AM       Lab Results   Component Value Date/Time    Glucose 102 (H) 10/09/2019 10:04 AM       HbA1c 6% on 10/6/19 (see \"Care Everywhere\" tab)    Hematology  Lab Results Component Value Date/Time    WBC 4.0 10/09/2019 10:04 AM    HGB 9.4 (L) 10/09/2019 10:04 AM    HCT 31.9 (L) 10/09/2019 10:04 AM    PLATELET 856 63/55/8076 10:04 AM    MCV 76.7 (L) 10/09/2019 10:04 AM       Thyroid Function    Lab Results   Component Value Date/Time    TSH 0.89 10/09/2019 10:04 AM    T4, Free 1.1 10/09/2019 10:04 AM     Vitals  Visit Vitals  BP (!) 150/96   Pulse 96   Temp 97.4 °F (36.3 °C)   Resp 20   Ht 160 cm (62.99\")   Wt 81.5 kg (179 lb 9.6 oz)   SpO2 100%   BMI 31.82 kg/m²       Pregnancy Test  Lab Results   Component Value Date/Time    Pregnancy test,urine (POC) NEGATIVE  10/09/2019 11:01 AM       Esme Santos, PharmD, BCPS  [phone] (pharmacy)

## 2019-10-11 NOTE — BH NOTES
Behavioral Health Treatment Team Note            Patient goal(s) for today: Work on boundaries  Treatment team focus/goals: Decrease manic symptoms  Progress note Patient is manic with pressured speech, tangential thoughts, and lability      LOS:   2                   Expected LOS: Monday     Financial concerns/prescription coverage: Employed at [employer] and on [employer]  Date of last family contact: none                                   Family requesting physician contact today: No  Discharge plan: Discharge to friend's home or to boyfriend's home  Guns in the home: None                                               Outpatient provider(s): Dr Melania Shepard at [facility], needs therapist      Participating treatment team members: Andres Quiñonez and Dr. Barbie Agrawal

## 2019-10-11 NOTE — PROGRESS NOTES
Pt did seek staff to discuss issues this shift.   Problem: Altered Thought Process (Adult/Pediatric)  Goal: *STG: Participates in treatment plan  Outcome: Progressing Towards Goal  Goal: *STG: Remains safe in hospital  Outcome: Progressing Towards Goal  Goal: *STG: Seeks staff when feelings of anxiety and fear arise  Outcome: Progressing Towards Goal  Goal: *STG: Complies with medication therapy  Outcome: Progressing Towards Goal

## 2019-10-11 NOTE — PROGRESS NOTES
1900- Report received from offging Nurse. 2100- Pt redirected from Nurse's station without incident. 2200- Pt in bed at this time, no concerns. 0200- Pt awake at this time, showered. Encouraged to get rest, offered PRN medication for insomnia, pt denied. Will continue to encourage. 0230- PRN Doxepin given for insomnia    0300- Pt remains slightly intrusive and stays at nurses station, encouraged to go to bed, pt will come out of room frequently. Redirection/reorienting continues. 0400- Pt continues to be restless, encouraged to rest.    0500- Pt in bed this shift. No concerns    0700- Report to be given to oncoming Nurse. Pt slept appx. 3.75 h this shift.

## 2019-10-11 NOTE — BH NOTES
Report given by Valerie Shetty LPN at 0066. Patient pleasant yet intrusive with staff and patients. Patient helping \"take care\" of other patients and voices concerns about other patient if she were discahrged from the unit. Patient easily redirectable. Patient medication compliant stating she wants to do what is right to make her self better. Patient meal compliant, spend majority of time in dayroom socializing with staff and peers. Patient attended all groups offered this morning. Patient continued to be pleasant during shift, manic. Patient continues to be focused on discharge and what is needed to happen for discharge.

## 2019-10-11 NOTE — GROUP NOTE
DONNA  GROUP DOCUMENTATION INDIVIDUAL Group Therapy Note Date: October 11 Group Start Time: 2769 Group End Time: 1000 Group Topic: [facility] [place] 3 ACUTE BEHAV [facility] [place]OMPSON  GROUP DOCUMENTATION GROUP Group Therapy Note Attendees: 4 Attendance: Attended Patient's Goal:  relaxation Interventions/techniques: Informed Follows Directions: Followed directions Interactions: Interacted appropriately Mental Status: Calm Behavior/appearance: Cooperative Goals Achieved: Able to engage in interactions and Able to listen to others Additional Notes:   
 
Lester Granado
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
## 2019-10-11 NOTE — GROUP NOTE
DONNA  GROUP DOCUMENTATION INDIVIDUAL Group Therapy Note Date: October 11 Group Start Time: 1100 Group End Time: 1200 Group Topic: Topic Group [facility] [place] 3 ACUTE BEHAV [facility] [place], [address] [facility] GROUP DOCUMENTATION GROUP Group Therapy Note Attendees: 3 Attendance: Attended Patient's Goal:  To identify positive words to live by Interventions/techniques: Provide feedback Follows Directions: Followed directions-with prompting Interactions: Disorganized interaction Mental Status: Anxious-hyper verbal 
 
Behavior/appearance: rude/intrusive at times-is redirectable Goals Achieved: Able to engage in interactions Additional Notes:   
 
Avril Elliott

## 2019-10-11 NOTE — PROGRESS NOTES
Problem: Altered Thought Process (Adult/Pediatric)  Goal: *LTG: Returns to baseline functioning  Outcome: Progressing Towards Goal     Problem: Altered Thought Process (Adult/Pediatric)  Goal: *STG: Participates in treatment plan  Outcome: Resolved/Met  Goal: *STG: Remains safe in hospital  Outcome: Resolved/Met  Goal: *STG: Seeks staff when feelings of anxiety and fear arise  Outcome: Resolved/Met  Goal: *STG: Complies with medication therapy  Outcome: Resolved/Met  Goal: *STG: Attends activities and groups  Outcome: Resolved/Met

## 2019-10-12 LAB
CHOLEST SERPL-MCNC: 146 MG/DL
HDLC SERPL-MCNC: 49 MG/DL
HDLC SERPL: 3 {RATIO} (ref 0–5)
LDLC SERPL CALC-MCNC: 75.4 MG/DL (ref 0–100)
LIPID PROFILE,FLP: NORMAL
TRIGL SERPL-MCNC: 108 MG/DL (ref ?–150)
VLDLC SERPL CALC-MCNC: 21.6 MG/DL

## 2019-10-12 PROCEDURE — 74011250637 HC RX REV CODE- 250/637: Performed by: STUDENT IN AN ORGANIZED HEALTH CARE EDUCATION/TRAINING PROGRAM

## 2019-10-12 PROCEDURE — 74011250637 HC RX REV CODE- 250/637: Performed by: PSYCHIATRY & NEUROLOGY

## 2019-10-12 PROCEDURE — 80061 LIPID PANEL: CPT

## 2019-10-12 PROCEDURE — 36415 COLL VENOUS BLD VENIPUNCTURE: CPT

## 2019-10-12 PROCEDURE — 65220000003 HC RM SEMIPRIVATE PSYCH

## 2019-10-12 RX ORDER — OLANZAPINE 5 MG/1
5 TABLET ORAL
Status: COMPLETED | OUTPATIENT
Start: 2019-10-12 | End: 2019-10-12

## 2019-10-12 RX ORDER — OLANZAPINE 5 MG/1
5 TABLET ORAL 2 TIMES DAILY
Status: DISCONTINUED | OUTPATIENT
Start: 2019-10-12 | End: 2019-10-14 | Stop reason: HOSPADM

## 2019-10-12 RX ADMIN — HYDROXYZINE HYDROCHLORIDE 50 MG: 25 TABLET, FILM COATED ORAL at 21:29

## 2019-10-12 RX ADMIN — OXCARBAZEPINE 300 MG: 150 TABLET, FILM COATED ORAL at 08:59

## 2019-10-12 RX ADMIN — FERROUS SULFATE TAB 325 MG (65 MG ELEMENTAL FE) 325 MG: 325 (65 FE) TAB at 08:59

## 2019-10-12 RX ADMIN — OLANZAPINE 5 MG: 5 TABLET, FILM COATED ORAL at 10:07

## 2019-10-12 RX ADMIN — OLANZAPINE 5 MG: 5 TABLET, FILM COATED ORAL at 21:29

## 2019-10-12 RX ADMIN — OLANZAPINE 5 MG: 5 TABLET, FILM COATED ORAL at 18:02

## 2019-10-12 RX ADMIN — OXCARBAZEPINE 300 MG: 150 TABLET, FILM COATED ORAL at 21:29

## 2019-10-12 NOTE — PROGRESS NOTES
Laboratory monitoring for mood stabilizer and antipsychotics:    Recommended baseline monitoring has been completed based on this patient's current medication regimen. The patient is currently taking the following medication(s):   Current Facility-Administered Medications   Medication Dose Route Frequency    OLANZapine (ZyPREXA) tablet 5 mg  5 mg Oral BID    OXcarbazepine (TRILEPTAL) tablet 300 mg  300 mg Oral BID    ferrous sulfate tablet 325 mg  1 Tab Oral DAILY WITH BREAKFAST       Serum Drug Level(s)    LITHIUM  Lab Results   Component Value Date/Time    Lithium level <0.20 (L) 03/28/2011 01:17 AM     Height, Weight, BMI Estimation  Estimated body mass index is 31.82 kg/m² as calculated from the following:    Height as of this encounter: 160 cm (62.99\"). Weight as of this encounter: 81.5 kg (179 lb 9.6 oz). Renal Function, Hepatic Function and Chemistry  Estimated Creatinine Clearance: 70.2 mL/min (A) (by C-G formula based on SCr of 1.13 mg/dL (H)). Lab Results   Component Value Date/Time    Sodium 142 10/09/2019 10:04 AM    Potassium 4.2 10/09/2019 10:04 AM    Chloride 107 10/09/2019 10:04 AM    CO2 26 10/09/2019 10:04 AM    Anion gap 9 10/09/2019 10:04 AM    Glucose 102 (H) 10/09/2019 10:04 AM    BUN 11 10/09/2019 10:04 AM    Creatinine 1.13 (H) 10/09/2019 10:04 AM    BUN/Creatinine ratio 10 (L) 10/09/2019 10:04 AM    GFR est AA >60 10/09/2019 10:04 AM    GFR est non-AA 55 (L) 10/09/2019 10:04 AM    Calcium 9.4 10/09/2019 10:04 AM    ALT (SGPT) 26 10/09/2019 10:04 AM    AST (SGOT) 23 10/09/2019 10:04 AM    Alk.  phosphatase 65 10/09/2019 10:04 AM    Protein, total 8.4 (H) 10/09/2019 10:04 AM    Albumin 4.0 10/09/2019 10:04 AM    Globulin 4.4 (H) 10/09/2019 10:04 AM    A-G Ratio 0.9 (L) 10/09/2019 10:04 AM    Bilirubin, total 0.3 10/09/2019 10:04 AM       Lab Results   Component Value Date/Time    Glucose 102 (H) 10/09/2019 10:04 AM       Hematology  Lab Results   Component Value Date/Time    WBC 4.0 10/09/2019 10:04 AM    HGB 9.4 (L) 10/09/2019 10:04 AM    HCT 31.9 (L) 10/09/2019 10:04 AM    PLATELET 654 85/90/4086 10:04 AM    MCV 76.7 (L) 10/09/2019 10:04 AM       Lipids  Lab Results   Component Value Date/Time    Cholesterol, total 146 10/12/2019 05:28 AM    HDL Cholesterol 49 10/12/2019 05:28 AM    LDL, calculated 75.4 10/12/2019 05:28 AM    Triglyceride 108 10/12/2019 05:28 AM    CHOL/HDL Ratio 3.0 10/12/2019 05:28 AM       Thyroid Function    Lab Results   Component Value Date/Time    TSH 0.89 10/09/2019 10:04 AM    T4, Free 1.1 10/09/2019 10:04 AM     Vitals  Visit Vitals  /89 (BP 1 Location: Right arm, BP Patient Position: Sitting)   Pulse 72   Temp 97.6 °F (36.4 °C)   Resp 18   Ht 160 cm (62.99\")   Wt 81.5 kg (179 lb 9.6 oz)   SpO2 96%   BMI 31.82 kg/m²       Pregnancy Test  Lab Results   Component Value Date/Time    Pregnancy test,urine (POC) NEGATIVE  10/09/2019 11:01 AM     Thank you,  Judee Pallas, PharmD, BCPS  [phone]

## 2019-10-12 NOTE — BH NOTES
0700  Assumed care of patient from night shift nurse    0900 patient compliant  vital signs. Did shower this morning. Continues to remain quit manic. Topic to topic. Denies the presence of any suicidal or homicidal ideation and the presence of any auditory or visual hallucinations. Slightly less intrusive today    0915  Patient refuses to take scheduled Geodon. Dr. Felisa Garcia notified. 1004  Patient agreed to take the scheduled Zyprexa in place of the Geodon as ordered by Dr. Felisa Garcia    [phone]  Patient ate lunch. Currently is rest time. No ill affects noted from Zyprexa    1500  Patient compliant during quiet time. No behaviors noted    1800  Patient attended [facility].   Currently with visitors No behaviors noted

## 2019-10-12 NOTE — PROGRESS NOTES
Problem: Altered Thought Process (Adult/Pediatric)  Goal: *STG: Decreased delusional thinking  Outcome: Progressing Towards Goal  Goal: *STG: Decreased hallucinations  Outcome: Progressing Towards Goal  Goal: *STG: Absence of lethality  Outcome: Progressing Towards Goal  Goal: *STG: Demonstrates ability to understand and use improved judgment in daily activities and relationships  Outcome: Progressing Towards Goal  Goal: *LTG: Returns to baseline functioning  Outcome: Progressing Towards Goal  Goal: Interventions  Outcome: Progressing Towards Goal     Problem: Falls - Risk of  Goal: *Absence of Falls  Description  Document Devonte Bowling Fall Risk and appropriate interventions in the flowsheet.   Outcome: Progressing Towards Goal  Note:   Fall Risk Interventions:
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
## 2019-10-12 NOTE — PROGRESS NOTES
1945- Patient is very intrusive,loud and defensive. Patient is at the nurses station yelling and screaming, patient is escorted back to room. 2045- Patient is now calm and listening to staff. 2130- Patient is asleep and had to be awaken to take medications. Problem: Altered Thought Process (Adult/Pediatric)  Goal: *LTG: Returns to baseline functioning  Outcome: Not Progressing Towards Goal  2230- Patient  is now asleep.  0300- Patient is now awake and back to talking loud and asking for different items. 0630- Patient slept 4 hrs.

## 2019-10-12 NOTE — GROUP NOTE
IP  GROUP DOCUMENTATION INDIVIDUAL Group Therapy Note Date: October 12 Group Start Time: 200 Group End Time: 1815 Group Topic: Nursing [facility] [address] ACUTE BEHAV [facility] [place]  GROUP DOCUMENTATION GROUP Group Therapy Note Attendees:  
 
 
 
 
  
 
Attendance: Attended Patient's Goal:  Coping mechanisms Interventions/techniques: Supported Follows Directions: Followed directions Interactions: Interacted appropriately Mental Status: Calm Behavior/appearance: Attentive Goals Achieved: Able to reflect/comment on own behavior Additional Notes:  *** Elkin Berger RN

## 2019-10-13 PROCEDURE — 74011250637 HC RX REV CODE- 250/637: Performed by: PSYCHIATRY & NEUROLOGY

## 2019-10-13 PROCEDURE — 65220000003 HC RM SEMIPRIVATE PSYCH

## 2019-10-13 PROCEDURE — 74011250637 HC RX REV CODE- 250/637: Performed by: STUDENT IN AN ORGANIZED HEALTH CARE EDUCATION/TRAINING PROGRAM

## 2019-10-13 RX ADMIN — OXCARBAZEPINE 300 MG: 150 TABLET, FILM COATED ORAL at 21:10

## 2019-10-13 RX ADMIN — OXCARBAZEPINE 300 MG: 150 TABLET, FILM COATED ORAL at 09:05

## 2019-10-13 RX ADMIN — MAGNESIUM HYDROXIDE 30 ML: 400 SUSPENSION ORAL at 09:48

## 2019-10-13 RX ADMIN — OLANZAPINE 5 MG: 5 TABLET, FILM COATED ORAL at 09:05

## 2019-10-13 RX ADMIN — FERROUS SULFATE TAB 325 MG (65 MG ELEMENTAL FE) 325 MG: 325 (65 FE) TAB at 09:05

## 2019-10-13 RX ADMIN — OLANZAPINE 5 MG: 5 TABLET, FILM COATED ORAL at 17:09

## 2019-10-13 NOTE — PROGRESS NOTES
0800 Awake in bed. No complaints voiced. Pleasant mood, calm, rapid speech. Denies SI/HI/hallucinations, depression, and anxiety at this time. 0948 PRN milk of magnesia given for GI discomfort. Pt also worried about potential constipation from her iron pill. 1000 Sitting in the dining room talking with peers. Compliant with medication. 1230 Remains in the dining room. Engages well with peers and staff. 1500 Frustrated at times by other patients asking her to stop talking so much. Briefly tearful. Easily redirectable. 1800 Compliant with evening medication. Pt reports she is hoping to discharge tomorrow. No agitation noted.

## 2019-10-13 NOTE — PROGRESS NOTES
1940- Patient  is talking loud and pacing the hallway. Problem: Altered Thought Process (Adult/Pediatric)  Goal: *STG: Decreased delusional thinking  Outcome: Not Progressing Towards Goal  2040- Patient is dancing and eating in the dayroom. Problem: Altered Thought Process (Adult/Pediatric)  Goal: *LTG: Returns to baseline functioning  Outcome: Not Progressing Towards Goal   2120- Patient vitals is stable and denies SI/HI/AH/VH. Patient took all of her meds including the prn meds Trazodone, Atarax and Zyprexa. 2300- Patient is asleep. 0130- Patient is awake and reading her bible. 5289- Patient told of the consequences of not taking her Doxepin and she refuse to take it. Patient is pacing up and down the hallway. 0300- Patient went back to sleep. 0430- Patient is awake and complaining of being hungry. Patient given crackers and chips to eat. 0630- Slept 3.5 hrs.
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
## 2019-10-13 NOTE — GROUP NOTE
DONNA  GROUP DOCUMENTATION INDIVIDUAL Group Therapy Note Date: October 13 Group Start Time: 7006 Group End Time: 2451 Group Topic: Nursing [facility] Alvin THOMPSON  GROUP DOCUMENTATION GROUP Group Therapy Note Attendees: 5 Attendance: Attended Patient's Goal:  Learn coping skills Interventions/techniques: Informed Follows Directions: Followed directions Interactions: Interacted appropriately Mental Status: Calm Behavior/appearance: Cooperative Goals Achieved: Able to engage in interactions Additional Notes:   
 
Rakan Wallace

## 2019-10-14 VITALS
WEIGHT: 179.6 LBS | HEART RATE: 98 BPM | DIASTOLIC BLOOD PRESSURE: 93 MMHG | HEIGHT: 63 IN | RESPIRATION RATE: 16 BRPM | BODY MASS INDEX: 31.82 KG/M2 | SYSTOLIC BLOOD PRESSURE: 141 MMHG | TEMPERATURE: 97.4 F | OXYGEN SATURATION: 100 %

## 2019-10-14 PROCEDURE — 74011250637 HC RX REV CODE- 250/637: Performed by: PSYCHIATRY & NEUROLOGY

## 2019-10-14 PROCEDURE — 74011250637 HC RX REV CODE- 250/637: Performed by: STUDENT IN AN ORGANIZED HEALTH CARE EDUCATION/TRAINING PROGRAM

## 2019-10-14 RX ORDER — DOXEPIN HYDROCHLORIDE 25 MG/1
25 CAPSULE ORAL
Qty: 30 CAP | Refills: 0 | Status: SHIPPED | OUTPATIENT
Start: 2019-10-14

## 2019-10-14 RX ORDER — OXCARBAZEPINE 300 MG/1
300 TABLET, FILM COATED ORAL 2 TIMES DAILY
Qty: 60 TAB | Refills: 0 | Status: SHIPPED | OUTPATIENT
Start: 2019-10-14

## 2019-10-14 RX ORDER — OLANZAPINE 5 MG/1
5 TABLET ORAL 2 TIMES DAILY
Qty: 60 TAB | Refills: 0 | Status: SHIPPED | OUTPATIENT
Start: 2019-10-14

## 2019-10-14 RX ORDER — LANOLIN ALCOHOL/MO/W.PET/CERES
325 CREAM (GRAM) TOPICAL
Qty: 60 TAB | Refills: 2 | Status: SHIPPED | OUTPATIENT
Start: 2019-10-15

## 2019-10-14 RX ADMIN — OXCARBAZEPINE 300 MG: 150 TABLET, FILM COATED ORAL at 09:05

## 2019-10-14 RX ADMIN — FERROUS SULFATE TAB 325 MG (65 MG ELEMENTAL FE) 325 MG: 325 (65 FE) TAB at 09:05

## 2019-10-14 RX ADMIN — OLANZAPINE 5 MG: 5 TABLET, FILM COATED ORAL at 09:05

## 2019-10-14 NOTE — PROGRESS NOTES
Pharmacist Discharge Medication Reconciliation    Discharging Provider: Dr. Cherelle Gurrola    Chief Complaint for this Admission:   Chief Complaint   Patient presents with    Mental Health Problem     Allergies: Patient has no known allergies. Discharge Medications:   Current Discharge Medication List        START taking these medications    Details   doxepin (SINEQUAN) 25 mg capsule Take 1 Cap by mouth nightly as needed (Insomnia). Indications: Bipolar Depression  Qty: 30 Cap, Refills: 0      ferrous sulfate 325 mg (65 mg iron) tablet Take 1 Tab by mouth daily (with breakfast). Indications: anemia from inadequate iron  Qty: 60 Tab, Refills: 2      OLANZapine (ZYPREXA) 5 mg tablet Take 1 Tab by mouth two (2) times a day. Indications: Bipolar I Disorder with Most Recent Episode Mixed  Qty: 60 Tab, Refills: 0           CONTINUE these medications which have CHANGED    Details   OXcarbazepine (TRILEPTAL) 300 mg tablet Take 1 Tab by mouth two (2) times a day.  Indications: Mixed Bipolar Disorder  Qty: 60 Tab, Refills: 0           STOP taking these medications       ziprasidone (GEODON) 80 mg capsule Comments:   Reason for Stopping:               The patient's chart, MAR and AVS were reviewed by Neha Arias, PHARMD, BCPS

## 2019-10-14 NOTE — PROGRESS NOTES
Problem: Altered Thought Process (Adult/Pediatric)  Goal: *STG: Decreased delusional thinking  Outcome: Progressing Towards Goal  Goal: *STG: Decreased hallucinations  Outcome: Progressing Towards Goal  Goal: *LTG: Returns to baseline functioning  Outcome: Progressing Towards Goal     Problem: Falls - Risk of  Goal: *Absence of Falls  Description  Document Dawit Goncalves Fall Risk and appropriate interventions in the flowsheet.   Outcome: Progressing Towards Goal  Note:   Fall Risk Interventions:                                Problem: Patient Education: Go to Patient Education Activity  Goal: Patient/Family Education  Outcome: Progressing Towards Goal

## 2019-10-14 NOTE — BH NOTES
Behavioral Health Transition Record to Provider    Patient Name: Blaire Simpson  YOB: 1985  Medical Record Number: [id number]  Date of Admission: 10/9/2019  Date of Discharge: 10/14/2019    Attending Provider: Geno Barney, *  Discharging Provider: Miguel Ángel Solares MD  To contact this individual call [phone] and ask the  to page. If unavailable, ask to be transferred to [facility] Provider on call. [facility] Provider will be available on call 24/7 and during holidays.     Primary Care Provider: None    No Known Allergies    Reason for Admission: darshan     Admission Diagnosis: Bipolar 1 disorder ([facility] [address] [F31.9]  Bipolar 1 disorder ([facility] [address] [F31.9]    * No surgery found *    Results for orders placed or performed during the hospital encounter of 10/09/19   URINALYSIS W/ REFLEX CULTURE   Result Value Ref Range    Color YELLOW/STRAW      Appearance CLEAR CLEAR      Specific gravity 1.030 1.003 - 1.030      pH (UA) 5.0 5.0 - 8.0      Protein NEGATIVE  NEG mg/dL    Glucose NEGATIVE  NEG mg/dL    Ketone NEGATIVE  NEG mg/dL    Bilirubin NEGATIVE  NEG      Blood NEGATIVE  NEG      Urobilinogen 0.2 0.2 - 1.0 EU/dL    Nitrites NEGATIVE  NEG      Leukocyte Esterase NEGATIVE  NEG      WBC 0-4 0 - 4 /hpf    RBC 0-5 0 - 5 /hpf    Epithelial cells FEW FEW /lpf    Bacteria NEGATIVE  NEG /hpf    UA:UC IF INDICATED CULTURE NOT INDICATED BY UA RESULT CNI     METABOLIC PANEL, COMPREHENSIVE   Result Value Ref Range    Sodium 142 136 - 145 mmol/L    Potassium 4.2 3.5 - 5.1 mmol/L    Chloride 107 97 - 108 mmol/L    CO2 26 21 - 32 mmol/L    Anion gap 9 5 - 15 mmol/L    Glucose 102 (H) 65 - 100 mg/dL    BUN 11 6 - 20 MG/DL    Creatinine 1.13 (H) 0.55 - 1.02 MG/DL    BUN/Creatinine ratio 10 (L) 12 - 20      GFR est AA >60 >60 ml/min/1.73m2    GFR est non-AA 55 (L) >60 ml/min/1.73m2    Calcium 9.4 8.5 - 10.1 MG/DL    Bilirubin, total 0.3 0.2 - 1.0 MG/DL    ALT (SGPT) 26 12 - 78 U/L    AST (SGOT) 23 15 - 37 U/L    Alk. phosphatase 65 45 - 117 U/L    Protein, total 8.4 (H) 6.4 - 8.2 g/dL    Albumin 4.0 3.5 - 5.0 g/dL    Globulin 4.4 (H) 2.0 - 4.0 g/dL    A-G Ratio 0.9 (L) 1.1 - 2.2     CBC WITH AUTOMATED DIFF   Result Value Ref Range    WBC 4.0 3.6 - 11.0 K/uL    RBC 4.16 3.80 - 5.20 M/uL    HGB 9.4 (L) 11.5 - 16.0 g/dL    HCT 31.9 (L) 35.0 - 47.0 %    MCV 76.7 (L) 80.0 - 99.0 FL    MCH 22.6 (L) 26.0 - 34.0 PG    MCHC 29.5 (L) 30.0 - 36.5 g/dL    RDW 17.6 (H) 11.5 - 14.5 %    PLATELET 468 876 - 599 K/uL    MPV 10.3 8.9 - 12.9 FL    NRBC 0.0 0  WBC    ABSOLUTE NRBC 0.00 0.00 - 0.01 K/uL    NEUTROPHILS 57 32 - 75 %    LYMPHOCYTES 24 12 - 49 %    MONOCYTES 13 5 - 13 %    EOSINOPHILS 4 0 - 7 %    BASOPHILS 2 (H) 0 - 1 %    IMMATURE GRANULOCYTES 0 0.0 - 0.5 %    ABS. NEUTROPHILS 2.3 1.8 - 8.0 K/UL    ABS. LYMPHOCYTES 1.0 0.8 - 3.5 K/UL    ABS. MONOCYTES 0.5 0.0 - 1.0 K/UL    ABS. EOSINOPHILS 0.2 0.0 - 0.4 K/UL    ABS. BASOPHILS 0.1 0.0 - 0.1 K/UL    ABS. IMM.  GRANS. 0.0 0.00 - 0.04 K/UL    DF AUTOMATED     ETHYL ALCOHOL   Result Value Ref Range    ALCOHOL(ETHYL),SERUM <10 <10 MG/DL   DRUG SCREEN, URINE   Result Value Ref Range    AMPHETAMINES NEGATIVE  NEG      BARBITURATES NEGATIVE  NEG      BENZODIAZEPINES NEGATIVE  NEG      COCAINE NEGATIVE  NEG      METHADONE NEGATIVE  NEG      OPIATES NEGATIVE  NEG      PCP(PHENCYCLIDINE) NEGATIVE  NEG      THC (TH-CANNABINOL) NEGATIVE  NEG      Drug screen comment (NOTE)    SALICYLATE   Result Value Ref Range    Salicylate level <0.3 (L) 2.8 - 20.0 MG/DL   ACETAMINOPHEN   Result Value Ref Range    Acetaminophen level <2 (L) 10 - 30 ug/mL   T4, FREE   Result Value Ref Range    T4, Free 1.1 0.8 - 1.5 NG/DL   TSH 3RD GENERATION   Result Value Ref Range    TSH 0.89 0.36 - 3.74 uIU/mL   LIPID PANEL   Result Value Ref Range    LIPID PROFILE          Cholesterol, total 146 <200 MG/DL    Triglyceride 108 <150 MG/DL    HDL Cholesterol 49 MG/DL    LDL, calculated 75.4 0 - 100 MG/DL    VLDL, calculated 21.6 MG/DL    CHOL/HDL Ratio 3.0 0.0 - 5.0     HCG URINE, QL. - POC   Result Value Ref Range    Pregnancy test,urine (POC) NEGATIVE  NEG         Immunizations administered during this encounter: There is no immunization history on file for this patient. Screening for Metabolic Disorders for Patients on Antipsychotic Medications  (Data obtained from the EMR)    Estimated Body Mass Index  Estimated body mass index is 31.82 kg/m² as calculated from the following:    Height as of this encounter: 5' 2.99\" (1.6 m). Weight as of this encounter: 81.5 kg (179 lb 9.6 oz). Vital Signs/Blood Pressure  Visit Vitals  /86 (BP 1 Location: Right arm, BP Patient Position: Sitting)   Pulse 78   Temp 97.6 °F (36.4 °C)   Resp 16   Ht 5' 2.99\" (1.6 m)   Wt 81.5 kg (179 lb 9.6 oz)   SpO2 100%   BMI 31.82 kg/m²       Blood Glucose/Hemoglobin A1c  Lab Results   Component Value Date/Time    Glucose 102 (H) 10/09/2019 10:04 AM       No results found for: HBA1C, HGBE8, [id number]     Lipid Panel  Lab Results   Component Value Date/Time    Cholesterol, total 146 10/12/2019 05:28 AM    HDL Cholesterol 49 10/12/2019 05:28 AM    LDL, calculated 75.4 10/12/2019 05:28 AM    Triglyceride 108 10/12/2019 05:28 AM    CHOL/HDL Ratio 3.0 10/12/2019 05:28 AM        Discharge Diagnosis: Please refer to physician's discharge plan. Discharge Plan: Pt was discharged and transported to her friend's home in [place]. Pt denies SI/HI/AH. Pt's mood is euphoric and affect is full strange. Pt's thought process is logical and goal oriented. Pt is in agreement with discharge plan. Discharge Medication List and Instructions:   Current Discharge Medication List          Unresulted Labs (24h ago, onward)    None        To obtain results of studies pending at discharge, please contact N/A.       Follow-up Information     Follow up With Specialties Details Why Contact Info    [facility]  Go on 10/18/2019 Medication management appointment with Ms. Precious Loredo on Friday, October 18th at 2:00 PM.  Address: [address] [place] [address] Syl Walker, [address] [place] [address]  Phone: [phone]  Go on 11/5/2019 Therapy appointment with Marci Schmitt on Tuesday, November 5th at 2:00 PM. Address: [address] [place] [address] Denise Hall, [address] [place] [address]  Phone: [phone]          Advanced Directive:   Does the patient have an appointed surrogate decision maker? Unknown   Does the patient have a Medical Advance Directive? Unknown   Does the patient have a Psychiatric Advance Directive? Unknown   If the patient does not have a surrogate or Medical Advance Directive AND Psychiatric Advance Directive, the patient was offered information on these advance directives. Unknown        Patient Instructions: Please continue all medications until otherwise directed by physician. Tobacco Cessation Discharge Plan:   Is the patient a smoker and needs referral for smoking cessation? No  Patient referred to the following for smoking cessation with an appointment? No   Patient was offered medication to assist with smoking cessation at discharge? No  Was education for smoking cessation added to the discharge instructions? No     Alcohol/Substance Abuse Discharge Plan:   Does the patient have a history of substance/alcohol abuse and requires a referral for treatment? No  Patient referred to the following for substance/alcohol abuse treatment with an appointment? No  Patient was offered medication to assist with alcohol cessation at discharge? No  Was education for substance/alcohol abuse added to discharge instructions? No     Patient discharged to Home; provided to the patient/caregiver either in hard copy or electronically. Continuing care paperwork was faxed to community mental health providers.
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
## 2019-10-14 NOTE — PROGRESS NOTES
1900- Report received from offgoing Nurse. 2100- Pt accepted all medications this shift. No concerns. No adverse reactions. 2300- Pt in bed at this time. 0100- Pt in bed at this time, no concerns. 0300- Pt remains in bed at this time. 0500-    0700- Report to be given to oncoming Nurse. Pt slept appx 3h this shift.

## 2019-10-14 NOTE — DISCHARGE INSTRUCTIONS
Patient Education      If I feel I am at risk of hurting myself or others, I will call the crisis office and speak with a crisis worker who will assist me during my crisis. [address]  [phone]  [facility] [place] [address] [phone]  [address] [facility]-  [phone]  [facility]  [phone]  [place] [facility]-  [phone]    Bipolar Disorder: Care Instructions  Your Care Instructions    Bipolar disorder is an illness that causes extreme mood changes, from times of very high energy (manic episodes) to times of depression. But many people with bipolar disorder show only the symptoms of depression. These moods may cause problems with your work, school, family life, friendships, and how well you function. This disease is also called manic-depression. There is no cure for bipolar disorder, but it can be helped with medicines. Counseling may also help. It is important to take your medicines exactly as prescribed, even when you feel well. You may need lifelong treatment. Follow-up care is a key part of your treatment and safety. Be sure to make and go to all appointments, and call your doctor if you are having problems. It's also a good idea to know your test results and keep a list of the medicines you take. How can you care for yourself at home? · Be safe with medicines. Take your medicines exactly as prescribed. Do not stop or change a medicine without talking to your doctor first. Kieran Srivastava and your doctor may need to try different combinations of medicines to find what works for you. · Take your medicines on schedule to keep your moods even. When you feel good, you may think that you do not need your medicines. But it is important to keep taking them. · Go to your counseling sessions. Call and talk with your counselor if you can't go to a session or if you don't think the sessions are helping. Do not just stop going.   · Get at least 30 minutes of activity on most days of the week. Walking is a good choice. You also may want to do other things, such as running, swimming, or cycling. · Get enough sleep. Keep your room dark and quiet. Try to go to bed at the same time every night. · Eat a healthy diet. This includes whole grains, dairy, fruits, vegetables, and protein. Eat foods from each of these groups. · Try to lower your stress. Manage your time, build a strong support system, and lead a healthy lifestyle. To lower your stress, try physical activity, slow deep breathing, or getting a massage. · Do not use alcohol or illegal drugs. · Learn the early signs of your mood changes. You can then take steps to help yourself feel better. · Ask for help from friends and family when you need it. You may need help with daily chores when you are depressed. When you are manic, you may need support to control your high energy levels. What should you do if someone in your family has bipolar disorder? · Learn about the disease and the signs that it is getting worse. · Remind your family member that you love him or her. · Make a plan with all family members about how to take care of your loved one when his or her symptoms are bad. · Talk about your fears and concerns and those of other family members. Seek counseling if needed. · Do not focus attention only on the person who is in treatment. · Remind yourself that it will take time for changes to occur. · Do not blame yourself for the disease. · Know your legal rights and the legal rights of your family member. Support groups or counselors can help you with this information. · Take care of yourself. Keep up with your own interests, such as your career, hobbies, and friends. Use exercise, positive self-talk, deep breathing, and other relaxing exercises to help lower your stress. · Give yourself time to grieve. You may need to deal with emotions such as anger, fear, and frustration.  After you work through your feelings, you will be better able to care for yourself and your family. · If you are having a hard time with your feelings or with your relationship with your family member, talk with a counselor. When should you call for help? Call 911 anytime you think you may need emergency care. For example, call if:    · You feel like hurting yourself or someone else.     · Someone who has bipolar disorder displays dangerous behavior, and you think the person might hurt himself or herself or someone else.   [facility] your doctor now or seek immediate medical care if:    · You hear voices.     · Someone you know has bipolar disorder and talks about suicide. Keep the numbers for these national suicide hotlines: 4-148-111-TALK (0-406.748.9888) and 9-679-JLLLZKL (9-822.483.9143). If a suicide threat seems real, with a specific plan and a way to carry it out, stay with the person, or ask someone you trust to stay with the person, until you can get help.     · Someone you know has bipolar disorder and:  ? Starts to give away possessions. ? Is using illegal drugs or drinking alcohol heavily. ? Talks or writes about death, including writing suicide notes or talking about guns, knives, or pills. ? Talks or writes about hurting someone else. ? Starts to spend a lot of time alone. ? Acts very aggressively or suddenly appears calm. ? Talks about beliefs that are not based in reality (delusions).    Watch closely for changes in your health, and be sure to contact your doctor if:    · You cannot go to your counseling sessions. Where can you learn more? Go to http://shannon-christy.info/. Enter K052 in the search box to learn more about \"Bipolar Disorder: Care Instructions. \"  Current as of: May 28, 2019  Content Version: 12.2  © 8765-7906 [employer]. Care instructions adapted under license by [employer] (which disclaims liability or warranty for this information).  If you have questions about a medical condition or this instruction, always ask your healthcare professional. [place] [address] any warranty or liability for your use of this information.

## 2019-10-14 NOTE — BH NOTES
Report recieved from Saqib Snell LPN at 6942. Patient calm and cooperative today. Patient ready for discharge, getting plans for after leaving. Patient denies SI/HI and AVH this morning. 1231  Patient discharged to home address. Patient given and signed for all belongings and valuables. Patient given discharge instructions including follow up appointments and prescriptions. Patient verbalized understanding.